# Patient Record
Sex: MALE | Race: WHITE | NOT HISPANIC OR LATINO | URBAN - METROPOLITAN AREA
[De-identification: names, ages, dates, MRNs, and addresses within clinical notes are randomized per-mention and may not be internally consistent; named-entity substitution may affect disease eponyms.]

---

## 2025-06-25 ENCOUNTER — TELEPHONE (OUTPATIENT)
Dept: GASTROENTEROLOGY | Facility: CLINIC | Age: 65
End: 2025-06-25
Payer: MEDICARE

## 2025-06-25 NOTE — TELEPHONE ENCOUNTER
"Pains in the lumbar spine, sometimes \"tailbone\" and sometimes in the rectum.  Also occasionally very intense sharp pain from the rectum that lasts about 5 seconds and then disappears.  Told him all of these sound more neuropathic, possibly disc or other nerve related and that starting with his PCP makes the most sense.    ----- Message from Inna CANSECO sent at 6/25/2025 10:17 AM EDT -----  He is having pain in his lower lumbar area and tail bone and it sometimes radiate into his rectum he says its not a constant pain its more like every now and then he has shooting pain in his rectum. He denies constipation.  "

## 2025-09-04 ENCOUNTER — APPOINTMENT (OUTPATIENT)
Dept: GASTROENTEROLOGY | Facility: CLINIC | Age: 65
End: 2025-09-04
Payer: MEDICARE